# Patient Record
Sex: MALE | Race: WHITE | Employment: FULL TIME | URBAN - METROPOLITAN AREA
[De-identification: names, ages, dates, MRNs, and addresses within clinical notes are randomized per-mention and may not be internally consistent; named-entity substitution may affect disease eponyms.]

---

## 2017-07-20 DIAGNOSIS — E78.00 HYPERCHOLESTEREMIA: Primary | ICD-10-CM

## 2017-08-08 ENCOUNTER — OFFICE VISIT (OUTPATIENT)
Dept: CARDIOLOGY CLINIC | Age: 57
End: 2017-08-08

## 2017-08-08 VITALS
OXYGEN SATURATION: 99 % | HEIGHT: 70 IN | HEART RATE: 60 BPM | SYSTOLIC BLOOD PRESSURE: 120 MMHG | BODY MASS INDEX: 23.05 KG/M2 | RESPIRATION RATE: 16 BRPM | DIASTOLIC BLOOD PRESSURE: 80 MMHG | WEIGHT: 161 LBS

## 2017-08-08 DIAGNOSIS — E78.00 HYPERCHOLESTEREMIA: Primary | ICD-10-CM

## 2017-08-08 DIAGNOSIS — E78.5 DYSLIPIDEMIA: ICD-10-CM

## 2017-08-08 NOTE — MR AVS SNAPSHOT
Visit Information Date & Time Provider Department Dept. Phone Encounter #  
 8/8/2017  2:40 PM Penny Cummings MD [facility] [phone] [id number] Upcoming Health Maintenance Date Due Hepatitis C Screening 1960 DTaP/Tdap/Td series (1 - Tdap) 11/30/1981 FOBT Q 1 YEAR AGE 50-75 11/30/2010 INFLUENZA AGE 9 TO ADULT 8/1/2017 Allergies as of 8/8/2017  Review Complete On: 8/8/2017 By: Penny Cummings MD  
  
 Severity Noted Reaction Type Reactions Keflex [Cephalexin]  01/21/2016    Rash Current Immunizations  Never Reviewed No immunizations on file. Not reviewed this visit You Were Diagnosed With   
  
 Codes Comments Hypercholesteremia    -  Primary ICD-10-CM: E78.00 ICD-9-CM: 272.0 Dyslipidemia     ICD-10-CM: E78.5 ICD-9-CM: 272.4 Vitals BP Pulse Resp Height(growth percentile) Weight(growth percentile) SpO2  
 120/80 (BP 1 Location: Left arm, BP Patient Position: Sitting) 60 16 5' 10\" (1.778 m) 161 lb (73 kg) 99% BMI Smoking Status 23.1 kg/m2 Never Smoker Vitals History BMI and BSA Data Body Mass Index Body Surface Area  
 23.1 kg/m 2 1.9 m 2 Your Updated Medication List  
  
   
This list is accurate as of: 8/8/17  3:11 PM.  Always use your most recent med list.  
  
  
  
  
 aspirin delayed-release 81 mg tablet Take  by mouth daily. We Performed the Following AMB POC EKG ROUTINE W/ 12 LEADS, INTER & REP [23431 CPT(R)] To-Do List   
 08/08/2017 ECHO:  2D ECHO COMPLETE ADULT (TTE) W OR WO CONTR Introducing [facility]! Dear Nikolai Plants: Thank you for requesting a [employer] account. Our records indicate that you already have an active [employer] account. You can access your account anytime at [email or web address]. [employer] Did you know that you can access your hospital and ER discharge instructions at any time in [employer]? You can also review all of your test results from your hospital stay or ER visit. Additional Information If you have questions, please visit the Frequently Asked Questions section of the [employer] website at [email or web address] Remember, [employer] is NOT to be used for urgent needs. For medical emergencies, dial 911. Now available from your iPhone and Android! Please provide this summary of care documentation to your next provider. Your primary care clinician is listed as Chrisandra Kehr. If you have any questions after today's visit, please call [phone].

## 2017-08-08 NOTE — PROGRESS NOTES
LAST OFFICE VISIT : 7/26/2016        ICD-10-CM ICD-9-CM   1. Hypercholesteremia E78.00 272.0   2. Dyslipidemia E78.5 272.4            Vikas Gan is a 64 y.o. male with HTN referred for 1 year follow up. Cardiac risk factors: hypertension, male gender. I have personally obtained the history from the patient. HISTORY OF PRESENTING ILLNESS      He is doing well with no cardiac complaints today. He continues to exercise daily with strength training and cardio. The patient denies chest pain/ shortness of breath, orthopnea, PND, LE edema, palpitations, syncope, presyncope or fatigue. ACTIVE PROBLEM LIST     Patient Active Problem List    Diagnosis Date Noted    Abnormal EKG 03/29/2016    FH: hypertension 03/29/2016           PAST MEDICAL HISTORY     No past medical history on file. PAST SURGICAL HISTORY     Past Surgical History:   Procedure Laterality Date    HX LAP CHOLECYSTECTOMY  2000          ALLERGIES     Allergies   Allergen Reactions    Keflex [Cephalexin] Rash          FAMILY HISTORY     Family History   Problem Relation Age of Onset    Hypertension Mother     Thyroid Disease Sister     negative for cardiac disease       SOCIAL HISTORY     Social History     Social History    Marital status:      Spouse name: N/A    Number of children: N/A    Years of education: N/A     Social History Main Topics    Smoking status: Never Smoker    Smokeless tobacco: None    Alcohol use 0.0 oz/week     0 Standard drinks or equivalent per week    Drug use: None    Sexual activity: Not Asked     Other Topics Concern    None     Social History Narrative         MEDICATIONS     Current Outpatient Prescriptions   Medication Sig    aspirin delayed-release 81 mg tablet Take  by mouth daily. No current facility-administered medications for this visit.         I have reviewed the nurses notes, vitals, problem list, allergy list, medical history, family, social history and medications. REVIEW OF SYMPTOMS      General: Pt denies excessive weight gain or loss. Pt is able to conduct ADL's  HEENT: Denies blurred vision, headaches, hearing loss, epistaxis and difficulty swallowing. Respiratory: Denies cough, congestion, shortness of breath, JOAQUIN, wheezing or stridor. Cardiovascular: Denies precordial pain, palpitations, edema or PND  Gastrointestinal: Denies poor appetite, indigestion, abdominal pain or blood in stool  Genitourinary: Denies hematuria, dysuria, increased urinary frequency  Musculoskeletal: Denies joint pain or swelling from muscles or joints  Neurologic: Denies tremor, paresthesias, headache, or sensory motor disturbance  Psychiatric: Denies confusion, insomnia, depression  Integumentray: Denies rash, itching or ulcers. Hematologic: Denies easy bruising, bleeding     PHYSICAL EXAMINATION      Vitals:    08/08/17 1457   BP: 120/80   Pulse: 60   Resp: 16   SpO2: 99%   Weight: 161 lb (73 kg)   Height: 5' 10\" (1.778 m)     General: Well developed, in no acute distress. HEENT: No jaundice, oral mucosa moist, no oral ulcers  Neck: Supple, no stiffness, no lymphadenopathy, supple  Heart:  slight murmur RUSB  Respiratory: Clear bilaterally x 4, no wheezing or rales  Extremities:  No edema, normal cap refill, no cyanosis. Musculoskeletal: No clubbing, no deformities  Neuro: A&Ox3, speech clear, gait stable, cooperative, no focal neurologic deficits  Skin: Skin color is normal. No rashes or lesions. Non diaphoretic, moist.  Vascular: 2+ pulses symmetric in all extremities         DIAGNOSTIC DATA     1. Cholesterol  1/8/16 -  TG 52 HDL 77   6/28/16- , TG 77, HDL 64, , LDL - p -750  7/11/17- , TG 74, HDL 73, , TG 74    2. EKG  1/21/16 - SB    3. Echo  2/2/16- EF 55%    4.  Calcium Score  2/2/16 - 0         LABORATORY DATA          No results found for: WBC, HGBPOC, HGB, HGBP, HCTPOC, HCT, PHCT, RBCH, PLT, MCV, HGBEXT, HCTEXT, PLTEXT, HGBEXT, HCTEXT, PLTEXT   Lab Results   Component Value Date/Time    Bilirubin, total 1.1 07/11/2017 08:43 AM    AST (SGOT) 30 07/11/2017 08:43 AM    Alk. phosphatase 54 07/11/2017 08:43 AM    Protein, total 7.1 07/11/2017 08:43 AM    Albumin 4.7 07/11/2017 08:43 AM    ALT (SGPT) 22 07/11/2017 08:43 AM           ASSESSMENT/RECOMMENDATIONS:.      1. HTN  -BP is under good control today on current medical regimen   -no adjustments in antihypertensives    2. Dyslipidemia   -LDL slightly elevated   -has calcium score of 2  -minimal risk factors  -do not favor a statin at this time     3. Slight murmur     4. Return in 6 weeks or prn    Orders Placed This Encounter    AMB POC EKG ROUTINE W/ 12 LEADS, INTER & REP     Order Specific Question:   Reason for Exam:     Answer:   ROUTINE    2D ECHO COMPLETE ADULT (TTE) W OR WO CONTR     Standing Status:   Future     Standing Expiration Date:   8/30/2017     Order Specific Question:   Contrast Enhancement (Bubble Study, Definity, Optison) may be used if criteria listed in established evidence-based protocol has been identified. Answer:   Yes     Order Specific Question:   Reason for Exam:     Answer:   chest pain,shortness of breath, AVR,MVR,AI, MR , AS        Follow-up Disposition: Not on File      I have discussed the diagnosis with  Aaron Schuster and the intended plan as seen in the above orders. Questions were answered concerning future plans. I have discussed medication side effects and warnings with the patient as well. Thank you,  Wm Dan MD for involving me in the care of  Aaron Schuster. Please do not hesitate to contact me for further questions/concerns. This note was written by orestes Olson, as dictated by Aaron Hill MD.      Flor Willoughby. MD Osei, [facility] [address]      [address] [address] [address]      [phone] / [phone] Fax

## 2018-03-20 ENCOUNTER — CLINICAL SUPPORT (OUTPATIENT)
Dept: CARDIOLOGY CLINIC | Age: 58
End: 2018-03-20

## 2018-03-20 ENCOUNTER — OFFICE VISIT (OUTPATIENT)
Dept: CARDIOLOGY CLINIC | Age: 58
End: 2018-03-20

## 2018-03-20 VITALS
HEART RATE: 68 BPM | SYSTOLIC BLOOD PRESSURE: 130 MMHG | WEIGHT: 157 LBS | HEIGHT: 70 IN | DIASTOLIC BLOOD PRESSURE: 82 MMHG | BODY MASS INDEX: 22.48 KG/M2

## 2018-03-20 DIAGNOSIS — Z82.49 FH: HYPERTENSION: Primary | ICD-10-CM

## 2018-03-20 DIAGNOSIS — R94.31 ABNORMAL EKG: Primary | ICD-10-CM

## 2018-03-20 DIAGNOSIS — E78.00 HYPERCHOLESTEREMIA: ICD-10-CM

## 2018-03-20 DIAGNOSIS — Z82.49 FH: HYPERTENSION: ICD-10-CM

## 2018-03-20 RX ORDER — SERTRALINE HYDROCHLORIDE 50 MG/1
25 TABLET, FILM COATED ORAL DAILY
COMMUNITY

## 2018-03-20 NOTE — PROGRESS NOTES
LAST OFFICE VISIT : 3/20/2018        ICD-10-CM ICD-9-CM   1. FH: hypertension Z82.49 V17.49   2. Hypercholesteremia E78.00 272.0            Mookie Sesay is a 62 y.o. male with hypertension and dyslipidemia referred for 6 month follow up. Cardiac risk factors: dyslipidemia, male gender, hypertension  I have personally obtained the history from the patient. HISTORY OF PRESENTING ILLNESS     Overall the pt states he is doing well. He reports that he was dealing with a lot of stress with work and was started on Zoloft. The pt feels much better and calmer on this. He states that he is exercising regularly and does well with this. The patient denies chest pain/ shortness of breath, orthopnea, PND, LE edema, palpitations, syncope, presyncope or fatigue. ACTIVE PROBLEM LIST     Patient Active Problem List    Diagnosis Date Noted    Abnormal EKG 03/29/2016    FH: hypertension 03/29/2016           PAST MEDICAL HISTORY     No past medical history on file. PAST SURGICAL HISTORY     Past Surgical History:   Procedure Laterality Date    HX LAP CHOLECYSTECTOMY  2000          ALLERGIES     Allergies   Allergen Reactions    Keflex [Cephalexin] Rash          FAMILY HISTORY     Family History   Problem Relation Age of Onset    Hypertension Mother     Thyroid Disease Sister     negative for cardiac disease       SOCIAL HISTORY     Social History     Social History    Marital status:      Spouse name: N/A    Number of children: N/A    Years of education: N/A     Social History Main Topics    Smoking status: Never Smoker    Smokeless tobacco: Never Used    Alcohol use 0.0 oz/week     0 Standard drinks or equivalent per week    Drug use: None    Sexual activity: Not Asked     Other Topics Concern    None     Social History Narrative         MEDICATIONS     Current Outpatient Prescriptions   Medication Sig    sertraline (ZOLOFT) 50 mg tablet Take 25 mg by mouth daily.     aspirin delayed-release 81 mg tablet Take  by mouth daily. No current facility-administered medications for this visit. I have reviewed the nurses notes, vitals, problem list, allergy list, medical history, family, social history and medications. REVIEW OF SYMPTOMS      General: Pt denies excessive weight gain or loss. Pt is able to conduct ADL's  HEENT: Denies blurred vision, headaches, hearing loss, epistaxis and difficulty swallowing. Respiratory: Denies cough, congestion, shortness of breath, JOAQUIN, wheezing or stridor. Cardiovascular: Denies precordial pain, palpitations, edema or PND  Gastrointestinal: Denies poor appetite, indigestion, abdominal pain or blood in stool  Genitourinary: Denies hematuria, dysuria, increased urinary frequency  Musculoskeletal: Denies joint pain or swelling from muscles or joints  Neurologic: Denies tremor, paresthesias, headache, or sensory motor disturbance  Psychiatric: Denies confusion, insomnia, depression  Integumentray: Denies rash, itching or ulcers. Hematologic: Denies easy bruising, bleeding     PHYSICAL EXAMINATION      Vitals:    03/20/18 1529   BP: 130/82   Pulse: 68   Weight: 157 lb (71.2 kg)   Height: 5' 10\" (1.778 m)     General: Well developed, in no acute distress. HEENT: No jaundice, oral mucosa moist, no oral ulcers  Neck: Supple, no stiffness, no lymphadenopathy, supple  Heart:  Normal S1/S2 negative S3 or S4. Regular, no murmur, gallop or rub, no jugular venous distention  Respiratory: Clear bilaterally x 4, no wheezing or rales  Abdomen:   Soft, non-tender, bowel sounds are active.   Extremities:  No edema, normal cap refill, no cyanosis. Musculoskeletal: No clubbing, no deformities  Neuro: A&Ox3, speech clear, gait stable, cooperative, no focal neurologic deficits  Skin: Skin color is normal. No rashes or lesions. Non diaphoretic, moist.  Vascular: 2+ pulses symmetric in all extremities        EKG:      DIAGNOSTIC DATA     1.  Cholesterol  1/8/16 -  TG 52 HDL 77   6/28/16- , TG 77, HDL 64, , LDL - p -750  7/11/17- , TG 74, HDL 73, , TG 74    2. EKG  1/21/16 - SB    3. Echo  2/2/16- EF 55%    4. Calcium Score  2/2/16 - 0         LABORATORY DATA          No results found for: WBC, HGBPOC, HGB, HGBP, HCTPOC, HCT, PHCT, RBCH, PLT, MCV, HGBEXT, HCTEXT, PLTEXT, HGBEXT, HCTEXT, PLTEXT   Lab Results   Component Value Date/Time    Bilirubin, total 1.1 07/11/2017 08:43 AM    AST (SGOT) 30 07/11/2017 08:43 AM    Alk. phosphatase 54 07/11/2017 08:43 AM    Protein, total 7.1 07/11/2017 08:43 AM    Albumin 4.7 07/11/2017 08:43 AM    ALT (SGPT) 22 07/11/2017 08:43 AM           ASSESSMENT/RECOMMENDATIONS:.      1. Hypertension  - Blood pressure is under good control. No adjustments in antihypertensives  2. Dyslipidemia  - Lipids are slightly elevated but in the setting of a normal calcium score I do not favor placing him on a statin at this time. 3. Slight murmur  - Echo today reveals that he has trace mitral and tricuspid regurgitation, nothing of significance.  -talked about exercise and reducing incidence of diastolic dysfunciton  4. Return in 1 year or PRN. Orders Placed This Encounter    sertraline (ZOLOFT) 50 mg tablet     Sig: Take 25 mg by mouth daily. Follow-up Disposition:  Return in about 6 months (around 9/20/2018). I have discussed the diagnosis with  Cricket Ngo and the intended plan as seen in the above orders. Questions were answered concerning future plans. I have discussed medication side effects and warnings with the patient as well. Thank you,  Tanner Jacobs MD for involving me in the care of  Cricket Ngo. Please do not hesitate to contact me for further questions/concerns. Written by James Chambers, as dictated by Kendra Pelaez MD.     Sasha Espana MD, [facility] [place]    Patient Care Team:  Tanner Jacobs MD as PCP - General (Internal Medicine)  Kendra Pelaez MD as Physician (Cardiology)    [address], [address] [place] [address]     [address]      [phone] / [phone] Fax

## 2018-03-20 NOTE — PROGRESS NOTES
Visit Vitals    /82    Pulse 68    Ht 5' 10\" (1.778 m)    Wt 157 lb (71.2 kg)    BMI 22.53 kg/m2

## 2018-03-20 NOTE — MR AVS SNAPSHOT
[address] [facility] 600 1007 [facility] 
[phone] Patient: Princess Munson MRN: [id number] Visit Information Date & Time Provider Department Dept. Phone Encounter #  
 3/20/2018  4:00 PM Verner Cheese, MD [facility] Emily Nguyen [phone] [id number] Follow-up Instructions Return in about 1 year (around 3/20/2019). Your Appointments 3/20/2018  4:00 PM  
ESTABLISHED PATIENT with Verner Cheese, MD  
[facility] ([facility] SCHEDULING) Appt Note: ECHO AT 8 AT 9 DR CURTIS r/s from 2/15 to 3/20  
 [address] [facility] [address] [place] [address]  
[phone]  
  
   
 [address] [place] [address]  
  
    
 3/28/2019  8:40 AM  
ESTABLISHED PATIENT with Verner Cheese, MD  
[facility] ([facility]ENNIFER SCHEDULING) [address]  
[phone] Upcoming Health Maintenance Date Due Hepatitis C Screening 1960 DTaP/Tdap/Td series (1 - Tdap) 11/30/1981 FOBT Q 1 YEAR AGE 50-75 11/30/2010 Influenza Age 5 to Adult 8/1/2017 Allergies as of 3/20/2018  Review Complete On: 3/20/2018 By: Verner Cheese, MD  
  
 Severity Noted Reaction Type Reactions Keflex [Cephalexin]  01/21/2016    Rash Current Immunizations  Never Reviewed No immunizations on file. Not reviewed this visit You Were Diagnosed With   
  
 Codes Comments FH: hypertension    -  Primary ICD-10-CM: Z82.49 
ICD-9-CM: V17.49 Hypercholesteremia     ICD-10-CM: E78.00 ICD-9-CM: 272.0 Vitals BP Pulse Height(growth percentile) Weight(growth percentile) BMI Smoking Status 130/82 68 5' 10\" (1.778 m) 157 lb (71.2 kg) 22.53 kg/m2 Never Smoker Vitals History BMI and BSA Data  Body Mass Index Body Surface Area  
 22.53 kg/m 2 1.88 m 2  
  
  
 Preferred Pharmacy Pharmacy Name Phone [id number] [phone] Your Updated Medication List  
  
   
This list is accurate as of 3/20/18  3:53 PM.  Always use your most recent med list.  
  
  
  
  
 aspirin delayed-release 81 mg tablet Take  by mouth daily. ZOLOFT 50 mg tablet Generic drug:  sertraline Take 25 mg by mouth daily. Follow-up Instructions Return in about 1 year (around 3/20/2019). Introducing [facility]! Dear Venice Heredia: Thank you for requesting a [employer] account. Our records indicate that you already have an active [employer] account. You can access your account anytime at [email or web address] Did you know that you can access your hospital and ER discharge instructions at any time in [employer]? You can also review all of your test results from your hospital stay or ER visit. Additional Information If you have questions, please visit the Frequently Asked Questions section of the [employer] website at [email or web address] Remember, [employer] is NOT to be used for urgent needs. For medical emergencies, dial 911. Now available from your iPhone and Android! Please provide this summary of care documentation to your next provider. Your primary care clinician is listed as Daniel Garcia. If you have any questions after today's visit, please call [phone].

## 2019-04-25 ENCOUNTER — OFFICE VISIT (OUTPATIENT)
Dept: CARDIOLOGY CLINIC | Age: 59
End: 2019-04-25

## 2019-04-25 VITALS
SYSTOLIC BLOOD PRESSURE: 120 MMHG | BODY MASS INDEX: 23.19 KG/M2 | DIASTOLIC BLOOD PRESSURE: 80 MMHG | HEART RATE: 64 BPM | WEIGHT: 162 LBS | HEIGHT: 70 IN

## 2019-04-25 DIAGNOSIS — R94.31 ABNORMAL EKG: ICD-10-CM

## 2019-04-25 DIAGNOSIS — I10 ESSENTIAL HYPERTENSION: ICD-10-CM

## 2019-04-25 DIAGNOSIS — E78.5 DYSLIPIDEMIA: Primary | ICD-10-CM

## 2019-04-25 NOTE — PROGRESS NOTES
LAST OFFICE VISIT : 3/20/2018        ICD-10-CM ICD-9-CM   1. Dyslipidemia E78.5 272.4   2. Abnormal EKG R94.31 794.31   3. Essential hypertension I10 401.9          Yobani Carrasco is a 62 y.o. male with hypertension and dyslipidemia who returns for annual follow up. Cardiac risk factors: dyslipidemia, male gender, hypertension  I have personally obtained the history from the patient. HISTORY OF PRESENTING ILLNESS     Yobani Carrasco reports that he has been doing well. Pt states that he has continued exercising routinely, using the elliptical for 35 minutes, then running two miles on the track. He says he has started sprinting as well, however, he is currently dealing with tendonitis in his right foot for the past week so he has backed off of running. He denies any CP, SOB, JOAQUIN, dizziness, or lightheadedness. He states he recently moved to [facility] after changing jobs. He says his new job is less stressful. The patient denies chest pain/ shortness of breath, orthopnea, PND, LE edema, palpitations, syncope, presyncope or fatigue. ACTIVE PROBLEM LIST     Patient Active Problem List    Diagnosis Date Noted    Abnormal EKG 03/29/2016    FH: hypertension 03/29/2016           PAST MEDICAL HISTORY     No past medical history on file.         PAST SURGICAL HISTORY     Past Surgical History:   Procedure Laterality Date    HX LAP CHOLECYSTECTOMY  2000          ALLERGIES     Allergies   Allergen Reactions    Keflex [Cephalexin] Rash          FAMILY HISTORY     Family History   Problem Relation Age of Onset    Hypertension Mother     Thyroid Disease Sister     negative for cardiac disease       SOCIAL HISTORY     Social History     Socioeconomic History    Marital status:      Spouse name: Not on file    Number of children: Not on file    Years of education: Not on file    Highest education level: Not on file   Tobacco Use    Smoking status: Never Smoker    Smokeless tobacco: Never Used   Substance and Sexual Activity    Alcohol use: Yes     Alcohol/week: 0.0 oz         MEDICATIONS     Current Outpatient Medications   Medication Sig    sertraline (ZOLOFT) 50 mg tablet Take 25 mg by mouth daily.  aspirin delayed-release 81 mg tablet Take  by mouth daily. No current facility-administered medications for this visit. I have reviewed the nurses notes, vitals, problem list, allergy list, medical history, family, social history and medications. REVIEW OF SYMPTOMS      General: Pt denies excessive weight gain or loss. Pt is able to conduct ADL's  HEENT: Denies blurred vision, headaches, hearing loss, epistaxis and difficulty swallowing. Respiratory: Denies cough, congestion, shortness of breath, JOAQUIN, wheezing or stridor. Cardiovascular: Denies precordial pain, palpitations, edema or PND  Gastrointestinal: Denies poor appetite, indigestion, abdominal pain or blood in stool  Genitourinary: Denies hematuria, dysuria, increased urinary frequency  Musculoskeletal: Denies joint pain or swelling from muscles or joints  Neurologic: Denies tremor, paresthesias, headache, or sensory motor disturbance  Psychiatric: Denies confusion, insomnia, depression  Integumentray: Denies rash, itching or ulcers. Hematologic: Denies easy bruising, bleeding     PHYSICAL EXAMINATION      Vitals:    04/25/19 0907   BP: 120/80   Pulse: 64   Weight: 162 lb (73.5 kg)   Height: 5' 10\" (1.778 m)     General: Well developed, in no acute distress. HEENT: No jaundice, oral mucosa moist, no oral ulcers  Neck: Supple, no stiffness, no lymphadenopathy, supple  Heart:  Normal S1/S2 negative S3 or S4. Regular, no murmur, gallop or rub, no jugular venous distention  Respiratory: Clear bilaterally x 4, no wheezing or rales  Abdomen:   Soft, non-tender, bowel sounds are active.   Extremities:  No edema, normal cap refill, no cyanosis.   Musculoskeletal: No clubbing, no deformities  Neuro: A&Ox3, speech clear, gait stable, cooperative, no focal neurologic deficits  Skin: Skin color is normal. No rashes or lesions. Non diaphoretic, moist.  Vascular: 2+ pulses symmetric in all extremities        EKG: sinus arrhthymias, HR 58 bpm.      DIAGNOSTIC DATA     1. Cholesterol  1/8/16 -  TG 52 HDL 77   6/28/16- , TG 77, HDL 64, , LDL - p -750  7/11/17- , TG 74, HDL 73, , TG 74    2. EKG  1/21/16 - SB    3. Echo  2/2/16- EF 55%  3/20/18- EF 60-65%    4. Calcium Score  2/2/16 - 0       LABORATORY DATA        No results found for: WBC, HGBPOC, HGB, HGBP, HCTPOC, HCT, PHCT, RBCH, PLT, MCV, HGBEXT, HCTEXT, PLTEXT, HGBEXT, HCTEXT, PLTEXT   Lab Results   Component Value Date/Time    Bilirubin, total 1.1 07/11/2017 08:43 AM    AST (SGOT) 30 07/11/2017 08:43 AM    Alk. phosphatase 54 07/11/2017 08:43 AM    Protein, total 7.1 07/11/2017 08:43 AM    Albumin 4.7 07/11/2017 08:43 AM    ALT (SGPT) 22 07/11/2017 08:43 AM           ASSESSMENT/RECOMMENDATIONS:.      1. Hypertension  - Blood pressure is under good control. No adjustments in antihypertensives. 2. Dyslipidemia. - Lipids are slightly elevated but in the setting of a normal calcium score I do not favor placing him on a statin at this time. - Will check [facility] labs and discuss to ensure his particle size and particle number are not elevated. Will the results over the phone. Gave him information on [facility]. Return in 2 years or PRN. Orders Placed This Encounter    AMB POC EKG ROUTINE W/ 12 LEADS, INTER & REP     Order Specific Question:   Reason for Exam:     Answer:   htn          Follow-up and Dispositions  ·   Return in about 2 years (around 4/25/2021). I have discussed the diagnosis with  Bony Albarado and the intended plan as seen in the above orders. Questions were answered concerning future plans. I have discussed medication side effects and warnings with the patient as well.     Thank you,  Mickeal Hatchet, MD for involving me in the care of  Berto Situ. Please do not hesitate to contact me for further questions/concerns. Written by Kathy Mccoy, as dictated by Ashlee Ferris MD.     Michaele Lewis Severiano Holding, MD, [facility]    Patient Care Team:  Mickeal Hatchet, MD as PCP - General (Internal Medicine)  Inocencia Gutierrez MD as Physician (Cardiology)    [address], [address] [place] [address]      [phone] / [phone] Fax

## 2019-05-03 LAB
% ALBUMIN, 58A: 64 % (ref 54–71)
ALB/GLOBRATIO, 58C: 1.77 (ref 1.15–2.5)
ALBUMIN SERPL-MCNC: 4.4 G/DL (ref 3.7–5.1)
ALP SERPL-CCNC: 57 U/L (ref 35–117)
ALT SERPL-CCNC: 20 U/L
ANION GAP SERPL CALC-SCNC: 8 MMOL/L (ref 6–18)
APOLIPOPROTEIN A-1, 6: 151 MG/DL
APOLIPOPROTEIN B , 48: 93 MG/DL
AST SERPL W P-5'-P-CCNC: 26 U/L (ref 5–40)
BILIRUB SERPL-MCNC: 1.3 MG/DL
BUN SERPL-MCNC: 15 MG/DL (ref 6–20)
BUN/CREATININE RATIO,BUCR: 16 (ref 10–27)
CALCIUM SERPL-MCNC: 9.4 MG/DL (ref 8.8–10.5)
CHLORIDE SERPL-SCNC: 109 MMOL/L (ref 98–110)
CHOLEST SERPL-MCNC: 207 MG/DL
CO2 SERPL-SCNC: 25 MMOL/L (ref 19–31)
CREAT SERPL-MCNC: 0.9 MG/DL (ref 0.7–1.2)
CRP SERPL HS-MCNC: 0.3 MG/L
EGFRAACREAT: 103 ML/MIN/1.73M^2
EGFRNACREAT: 89 ML/MIN/1.73M^2
ESTIMATED AVERAGE GLUCOSE, EAG: 108.3 MG/DL
FFA FREE FATTY ACIDS, 64: 0.58 MMOL/L
GLOBCALC, 58B: 2.5 G/DL (ref 1.9–3.5)
GLUCOSE SERPL-MCNC: 95 MG/DL (ref 70–99)
HBA1C MFR BLD HPLC: 5.4 %
HDL-C, 884255: 65 MG/DL
HDL2-DIAZ: 21 MG/DL
HOMOCYSTEINE,PLASMA,HOMCY: 11 UMOL/L
LDLC SERPL CALC-MCNC: 133 MG/DL
LP-PLA2 ACTIVITY: 234 (NMOL/MIN/ML) (ref 196–225)
MPOAU: 201 PMOL/L
NON-HDL CHOLESTEROL, 011976: 142 MG/DL
POTASSIUM SERPL-SCNC: 4.3 MMOL/L (ref 3.5–5.3)
PROT SERPL-MCNC: 6.8 G/DL (ref 6.1–8)
SDLDL-DIAZ: 27 MG/DL
SODIUM SERPL-SCNC: 142 MMOL/L (ref 133–145)
TRIGL SERPL-MCNC: 96 MG/DL (ref ?–150)

## 2019-05-04 LAB
HDL HDL-P, HDL5001: 39.4 UMOL/L
HDL LDL-P, HDL5000: 1780 NMOL/L
HDL SLDL-P, HDL5002: 608 NMOL/L
HOMA-IR, HDL2100: 0.8
INSULIN,INS: 3 UU/ML (ref 3–9)
LP(A)-P, HDL4000: < 50 NMOL/L
PRO BNP NT,BNPNT: 23 PG/ML
VIT D 25-HYDROXY, VDLT: 22 NG/ML (ref 30–100)

## 2019-05-05 LAB
CAMPESTEROL, HDL1302: 6.96 UG/ML (ref 2.11–4.43)
CHOLESTANOL, HDL1304: 3.3 UG/ML (ref 2.02–3.47)
DESMOSTEROL, HDL1306: 1.27 UG/ML
SITOSTEROL, HDL1300: 4.28 UG/ML (ref 1.43–3.17)

## 2019-05-06 LAB
AA2: 16.54 % (ref 10.5–23.3)
ALPHA LINOLEIC ACID N3, HDL1202: 0.14 % (ref 0.1–0.4)
CIS-MONO-UNSATURATED FATTY ACID TOTAL, HDL1205: 15.9 (ref 11.5–20.5)
DOCOSAHEXAENOIC ACID N3, HDL1208: 2.63 % (ref 0.1–8.4)
DOCOSAPENTAENOIC ACID N3, HDL1206: 2.52 % (ref 0.6–4.1)
DOCOSAPENTAENOIC ACID N6, HDL1207: 0.77 % (ref 0.1–1.3)
EPA2: 0.39 % (ref 0.1–2.5)
LINOLEIC ACID C6, HDL1216: 14.22 % (ref 4.6–21.3)
OMEGA-3 FATTY ACID TOTAL, HDL1220: 5.7 (ref 0.1–14.1)
OMEGA-3 INDEX, HDL1219: 3 (ref 0.1–10.4)
OMEGA-6 FATTY ACID TOTAL, HDL1222: 37.1 (ref 28.6–44.5)
SATURATED FATTY ACID TOTAL, HDL1226: 40.7 (ref 36.6–42)
TRANS FATTY ACID TOTAL, HDL1232: 0.5 (ref 0.1–1.8)
TRANSLINOLEIC ACID, HDL1229: <0.1 % (ref 0.1–0.5)
TRANSOLEIC ACID, HDL1230: 0.37 % (ref 0.1–1.3)

## 2019-05-29 DIAGNOSIS — E78.5 DYSLIPIDEMIA: Primary | ICD-10-CM

## 2019-05-29 DIAGNOSIS — I10 ESSENTIAL HYPERTENSION: ICD-10-CM

## 2019-06-06 ENCOUNTER — DOCUMENTATION ONLY (OUTPATIENT)
Dept: CARDIOLOGY CLINIC | Age: 59
End: 2019-06-06

## 2019-08-02 LAB
APO E GENOTYPE: NORMAL
FACTOR V LEIDEN MUTATION, 511559: NORMAL
MTHFR 1298 RAW, HDL4301: NORMAL
MTHFR 1298, HDL4300: NORMAL
MTHFR677 METHYLENETETRAHYDROFOLATE REDUCTASE, 140: NORMAL
PROTHROMBIN G20210A MUTATION, 69: NORMAL

## 2019-10-25 DIAGNOSIS — E78.5 DYSLIPIDEMIA: ICD-10-CM

## 2019-10-25 DIAGNOSIS — R94.31 ABNORMAL EKG: ICD-10-CM
